# Patient Record
Sex: MALE | Race: WHITE | Employment: FULL TIME | URBAN - METROPOLITAN AREA
[De-identification: names, ages, dates, MRNs, and addresses within clinical notes are randomized per-mention and may not be internally consistent; named-entity substitution may affect disease eponyms.]

---

## 2017-07-02 ENCOUNTER — HOSPITAL ENCOUNTER (OUTPATIENT)
Age: 47
Discharge: HOME | End: 2017-07-02
Attending: PEDIATRICS
Payer: COMMERCIAL

## 2017-07-02 VITALS
WEIGHT: 300 LBS | TEMPERATURE: 98 F | OXYGEN SATURATION: 96 % | BODY MASS INDEX: 34.71 KG/M2 | DIASTOLIC BLOOD PRESSURE: 75 MMHG | RESPIRATION RATE: 18 BRPM | HEART RATE: 76 BPM | SYSTOLIC BLOOD PRESSURE: 121 MMHG | HEIGHT: 78 IN

## 2017-07-02 DIAGNOSIS — M77.51 RIGHT ANKLE TENDONITIS: Primary | ICD-10-CM

## 2017-07-02 PROCEDURE — 99202 OFFICE O/P NEW SF 15 MIN: CPT

## 2017-07-02 RX ORDER — LAMOTRIGINE 100 MG/1
100 TABLET ORAL DAILY
COMMUNITY

## 2017-07-02 NOTE — ED PROVIDER NOTES
[facility]    History   Patient presents with:  Musculoskeletal Problem    Stated Complaint: RT ANKLE PAIN    HPI    HPI: Nuno uDvall is a 42year old male who presents for right ankle discomfort for 1 °C)  Temp src: Oral  SpO2: 96 %  O2 Device: None (Room air)    Current:/75   Pulse 76   Temp 97.9 °F (36.6 °C) (Oral)   Resp 18   Ht 198.1 cm (6' 6\")   Wt 136.1 kg   SpO2 96%   BMI 34.67 kg/m²         Physical Exam      MENTAL STATUS: Alert, Everlena Rides